# Patient Record
Sex: MALE | Race: ASIAN | NOT HISPANIC OR LATINO | URBAN - METROPOLITAN AREA
[De-identification: names, ages, dates, MRNs, and addresses within clinical notes are randomized per-mention and may not be internally consistent; named-entity substitution may affect disease eponyms.]

---

## 2021-02-15 ENCOUNTER — COMMUNICATION - HEALTHEAST (OUTPATIENT)
Dept: SCHEDULING | Facility: CLINIC | Age: 51
End: 2021-02-15

## 2021-06-16 PROBLEM — G45.9 TIA (TRANSIENT ISCHEMIC ATTACK): Status: ACTIVE | Noted: 2021-02-14

## 2023-10-29 ENCOUNTER — APPOINTMENT (OUTPATIENT)
Dept: MRI IMAGING | Facility: HOSPITAL | Age: 53
End: 2023-10-29
Attending: NURSE PRACTITIONER
Payer: COMMERCIAL

## 2023-10-29 ENCOUNTER — HOSPITAL ENCOUNTER (EMERGENCY)
Facility: HOSPITAL | Age: 53
Discharge: HOME | End: 2023-10-29
Attending: EMERGENCY MEDICINE | Admitting: EMERGENCY MEDICINE
Payer: COMMERCIAL

## 2023-10-29 ENCOUNTER — APPOINTMENT (OUTPATIENT)
Dept: RADIOLOGY | Facility: HOSPITAL | Age: 53
End: 2023-10-29
Attending: EMERGENCY MEDICINE
Payer: COMMERCIAL

## 2023-10-29 VITALS
DIASTOLIC BLOOD PRESSURE: 75 MMHG | SYSTOLIC BLOOD PRESSURE: 161 MMHG | TEMPERATURE: 97.2 F | HEART RATE: 64 BPM | WEIGHT: 146 LBS | RESPIRATION RATE: 20 BRPM | BODY MASS INDEX: 24.3 KG/M2 | OXYGEN SATURATION: 96 %

## 2023-10-29 DIAGNOSIS — R42 DIZZINESS: ICD-10-CM

## 2023-10-29 DIAGNOSIS — Z86.39 HISTORY OF DIABETES MELLITUS: ICD-10-CM

## 2023-10-29 DIAGNOSIS — R07.9 CHEST PAIN, UNSPECIFIED TYPE: ICD-10-CM

## 2023-10-29 DIAGNOSIS — I10 HYPERTENSION, UNSPECIFIED TYPE: ICD-10-CM

## 2023-10-29 DIAGNOSIS — H81.11 BENIGN PAROXYSMAL POSITIONAL VERTIGO OF RIGHT EAR: ICD-10-CM

## 2023-10-29 LAB
ANION GAP SERPL CALCULATED.3IONS-SCNC: 12 MMOL/L (ref 7–15)
BASOPHILS # BLD AUTO: 0 10E3/UL (ref 0–0.2)
BASOPHILS NFR BLD AUTO: 0 %
BUN SERPL-MCNC: 23.7 MG/DL (ref 6–20)
CALCIUM SERPL-MCNC: 9.4 MG/DL (ref 8.6–10)
CHLORIDE SERPL-SCNC: 101 MMOL/L (ref 98–107)
CREAT SERPL-MCNC: 1.31 MG/DL (ref 0.67–1.17)
DEPRECATED HCO3 PLAS-SCNC: 24 MMOL/L (ref 22–29)
EGFRCR SERPLBLD CKD-EPI 2021: 65 ML/MIN/1.73M2
EOSINOPHIL # BLD AUTO: 0.2 10E3/UL (ref 0–0.7)
EOSINOPHIL NFR BLD AUTO: 2 %
ERYTHROCYTE [DISTWIDTH] IN BLOOD BY AUTOMATED COUNT: 12.7 % (ref 10–15)
GLUCOSE SERPL-MCNC: 177 MG/DL (ref 70–99)
HCT VFR BLD AUTO: 42.6 % (ref 40–53)
HGB BLD-MCNC: 14.3 G/DL (ref 13.3–17.7)
IMM GRANULOCYTES # BLD: 0 10E3/UL
IMM GRANULOCYTES NFR BLD: 1 %
LYMPHOCYTES # BLD AUTO: 1.2 10E3/UL (ref 0.8–5.3)
LYMPHOCYTES NFR BLD AUTO: 20 %
MCH RBC QN AUTO: 28.3 PG (ref 26.5–33)
MCHC RBC AUTO-ENTMCNC: 33.6 G/DL (ref 31.5–36.5)
MCV RBC AUTO: 84 FL (ref 78–100)
MONOCYTES # BLD AUTO: 0.4 10E3/UL (ref 0–1.3)
MONOCYTES NFR BLD AUTO: 7 %
NEUTROPHILS # BLD AUTO: 4.3 10E3/UL (ref 1.6–8.3)
NEUTROPHILS NFR BLD AUTO: 70 %
NRBC # BLD AUTO: 0 10E3/UL
NRBC BLD AUTO-RTO: 0 /100
NT-PROBNP SERPL-MCNC: 162 PG/ML (ref 0–900)
PLATELET # BLD AUTO: 194 10E3/UL (ref 150–450)
POTASSIUM SERPL-SCNC: 4.5 MMOL/L (ref 3.4–5.3)
RBC # BLD AUTO: 5.06 10E6/UL (ref 4.4–5.9)
SODIUM SERPL-SCNC: 137 MMOL/L (ref 135–145)
TROPONIN T SERPL HS-MCNC: 21 NG/L
TROPONIN T SERPL HS-MCNC: 22 NG/L
WBC # BLD AUTO: 6.2 10E3/UL (ref 4–11)

## 2023-10-29 PROCEDURE — 99285 EMERGENCY DEPT VISIT HI MDM: CPT | Mod: 25

## 2023-10-29 PROCEDURE — 99207 PR NO CHARGE LOS: CPT | Performed by: NURSE PRACTITIONER

## 2023-10-29 PROCEDURE — 70553 MRI BRAIN STEM W/O & W/DYE: CPT

## 2023-10-29 PROCEDURE — 84484 ASSAY OF TROPONIN QUANT: CPT | Performed by: EMERGENCY MEDICINE

## 2023-10-29 PROCEDURE — 93005 ELECTROCARDIOGRAM TRACING: CPT | Performed by: STUDENT IN AN ORGANIZED HEALTH CARE EDUCATION/TRAINING PROGRAM

## 2023-10-29 PROCEDURE — 70544 MR ANGIOGRAPHY HEAD W/O DYE: CPT | Mod: XU

## 2023-10-29 PROCEDURE — 71046 X-RAY EXAM CHEST 2 VIEWS: CPT

## 2023-10-29 PROCEDURE — 85025 COMPLETE CBC W/AUTO DIFF WBC: CPT | Performed by: EMERGENCY MEDICINE

## 2023-10-29 PROCEDURE — 255N000002 HC RX 255 OP 636: Mod: JZ | Performed by: EMERGENCY MEDICINE

## 2023-10-29 PROCEDURE — 70549 MR ANGIOGRAPH NECK W/O&W/DYE: CPT

## 2023-10-29 PROCEDURE — 93005 ELECTROCARDIOGRAM TRACING: CPT | Performed by: EMERGENCY MEDICINE

## 2023-10-29 PROCEDURE — 36415 COLL VENOUS BLD VENIPUNCTURE: CPT | Performed by: EMERGENCY MEDICINE

## 2023-10-29 PROCEDURE — 83880 ASSAY OF NATRIURETIC PEPTIDE: CPT | Performed by: EMERGENCY MEDICINE

## 2023-10-29 PROCEDURE — A9585 GADOBUTROL INJECTION: HCPCS | Mod: JZ | Performed by: EMERGENCY MEDICINE

## 2023-10-29 PROCEDURE — 80048 BASIC METABOLIC PNL TOTAL CA: CPT | Performed by: EMERGENCY MEDICINE

## 2023-10-29 RX ORDER — GADOBUTROL 604.72 MG/ML
6 INJECTION INTRAVENOUS ONCE
Status: COMPLETED | OUTPATIENT
Start: 2023-10-29 | End: 2023-10-29

## 2023-10-29 RX ADMIN — GADOBUTROL 6 ML: 604.72 INJECTION INTRAVENOUS at 10:32

## 2023-10-29 ASSESSMENT — ENCOUNTER SYMPTOMS
VOMITING: 0
DIZZINESS: 1
ABDOMINAL PAIN: 0
DIARRHEA: 0
NAUSEA: 0
DIFFICULTY URINATING: 0
HEADACHES: 1

## 2023-10-29 ASSESSMENT — ACTIVITIES OF DAILY LIVING (ADL)
ADLS_ACUITY_SCORE: 35

## 2023-10-29 NOTE — ED PROVIDER NOTES
EMERGENCY DEPARTMENT ENCOUNTER      NAME: Pio Vides  AGE: 53 year old male  YOB: 1970  MRN: [id number]  EVALUATION DATE & TIME: No admission date for patient encounter.    PCP: No primary care provider on file.    ED PROVIDER: Indira Norris M.D.      CHIEF COMPLAINT     Chief Complaint   Patient presents with     Dizziness         FINAL IMPRESSION:     1. Dizziness    2. Benign paroxysmal positional vertigo of right ear    3. Hypertension, unspecified type    4. History of diabetes mellitus    5. Chest pain, unspecified type          MEDICAL DECISION MAKING:       Pertinent Labs & Imaging studies reviewed. (See chart for details)    53 year old male presents to the Emergency Department for evaluation of dizziness with turning the head.    History  Supplemental history from [facility]  External Record(s) review as documented below    Exam  Well-appearing cooperative and pleasant.  No focal neurological deficits.    Differential Diagnosis include but not limited to positional vertigo TIA CVA acute coronary syndrome sepsis hypertensive emergency among others.      Vital Signs: Hypertensive  EKG: Normal sinus rhythm with sinus arrhythmia normal anterior progression normal axis  Imaging: I independently interpreted cxr no acute.Formal read by radiologist.  Home meds: reviewed  ED meds: Patient took his own blood pressure medications.  Fluids: TKO  Labs:  K 4.5  Cr 1.31  Wbc 6.2  Hgb 14.3  platelets 194  Troponin 12 - 22    Clinical Impression and Decision Making  52 yo male history of DM HTN TIA  Here with vertigo after turning head to right  Short episode of chest discomfort  Not exertional no nausea  Sx resolved at arrival  No focal neuro deficit  D/w Stroke Team  MRI no acute  Already on plavix  EKG x 2 no acute  Troponin not increasing  Referral [facility]  Patient and wife in agreement  Discharged ambulatory in stable condition    In addition to the work out documented, I considered the  following work up Admission  Patient symptoms resolved  Already on plavix  MRI negative  Patient comfortable going home           Medical Decision Making    History:    Supplemental history from: Documented in chart, if applicable    External Record(s) reviewed: Documented in chart, if applicable.    Work Up:    Chart documentation includes differential considered and any EKGs or imaging independently interpreted by provider, where specified.    In additional to work up documented, I considered the following work up: Documented in chart, if applicable.    External consultation:    Discussion of management with another provider: Documented in chart, if applicable    Complicating factors:    Care impacted by chronic illness: Cerebrovascular Disease, Diabetes, Hyperlipidemia, and Hypertension    Care affected by social determinants of health: N/A    Disposition considerations: Discharged  Instructed to continue Plavix          Review of External Records  Hospital/Clinic: [facility] (telemedicine)  Date:9/18/23  Type II diabetes, takes insulin. Hypertension, hyperlipidemia.    External Consultation  Stroke Team      ED COURSE   8:41 AM I met with the patient, obtained history, performed an initial exam, and discussed options and plan for diagnostics and treatment here in the ED.    8:57 AM Wife at bedside. Reevaluated and updated.    9:10 AM Spoke with stroke team.    9:27 AM Reevaluated and updated.    12:02 PM Reevaluated and updated.    12:55 PM Reevaluated and updated on plan for discharge. Recommended to continue taking Plavix    1:21 PM. Updated all questions answered     At the conclusion of the encounter I discussed the results of all of the tests and the disposition. The questions were answered. The patient and wife acknowledged understanding and was agreeable with the care plan.         MEDICATIONS GIVEN IN THE EMERGENCY:     Medications   gadobutrol (GADAVIST)  injection 6 mL (6 mLs Intravenous $Given 10/29/23 1032)       NEW PRESCRIPTIONS STARTED AT TODAY'S ER VISIT     Discharge Medication List as of 10/29/2023  1:09 PM             =================================================================    HPI     Patient information was obtained from: patient     Use of : N/A        Pio Vides is a 53 year old male who presents by walking.    Patient reports he went to the bathroom at 6:30 AM and felt dizzy with when he laid down on his right side. He laid on his back and it went away. He got up to brush is teeth 10 minutes later and the dizziness returned. Patient went to bed at 11 PM last night. He also notes a right-sided headache. He has not taken his blood pressure meds today and usually takes them around 7 or 8 AM.    Patient denies any vision changes, abdominal pain, nausea, vomiting, diarrhea, urinary symptoms, smoking, or difficulty walking.    Chart Review: 2/14/23 ED visit at [facility] for a TIA.    REVIEW OF SYSTEMS   Review of Systems   Eyes:  Negative for visual disturbance.   Gastrointestinal:  Negative for abdominal pain, diarrhea, nausea and vomiting.   Genitourinary:  Negative for difficulty urinating.   Neurological:  Positive for dizziness and headaches (right side).   All other systems reviewed and are negative.       PAST MEDICAL HISTORY:   No past medical history on file.    PAST SURGICAL HISTORY:   No past surgical history on file.      CURRENT MEDICATIONS:   aspirin 81 MG EC tablet  atenoloL (TENORMIN) 50 MG tablet  canagliflozin (INVOKANA) 100 mg Tab  clopidogreL (PLAVIX) 75 mg tablet  hydrALAZINE (APRESOLINE) 25 MG tablet  insulin glargine (LANTUS SOLOSTAR PEN) 100 unit/mL (3 mL) pen  insulin lispro (HUMALOG KWIKPEN INSULIN SUBQ)  liraglutide (VICTOZA) 0.6 mg/0.1 mL (18 mg/3 mL) injection  losartan (COZAAR) 100 MG tablet  rosuvastatin (CRESTOR) 40 MG tablet         ALLERGIES:     Allergies   Allergen Reactions     Ace Inhibitors Cough      Amlodipine Unknown     Swelling of lower extremities     Irbesartan Rash       FAMILY HISTORY:   No family history on file.    SOCIAL HISTORY:     Social History     Socioeconomic History     Marital status:        VITALS:   BP (!) 161/75   Pulse 64   Temp 97.2  F (36.2  C) (Tympanic)   Resp 20   Wt 66.2 kg (146 lb)   SpO2 96%   BMI 24.30 kg/m      PHYSICAL EXAM     Physical Exam  Vitals and nursing note reviewed. Exam conducted with a chaperone present.   Constitutional:       General: He is not in acute distress.     Appearance: Normal appearance. He is obese. He is not ill-appearing, toxic-appearing or diaphoretic.   Neurological:      Mental Status: He is alert.         Physical Exam   Constitutional: Well-appearing.    Head: Atraumatic.     Nose: Nose normal.     Mouth/Throat: Oropharynx is clear and moist.     Eyes: EOM are normal. Pupils are equal, round, and reactive to light.     Ears: Bilateral pearly white tympanic membranes.    Neck: Normal range of motion. Neck supple.     Cardiovascular: Normal rate, regular rhythm and normal heart sounds.      Pulmonary/Chest: Normal effort  and breath sounds normal.     Abdominal: soft nontender.    Musculoskeletal: Normal range of motion.     Neurological: Moves upper and lower extremities equally.    Lymphatics: no edema    : NA    Skin: Skin is warm and dry. Surgical scar on left arm.    Psychiatric: Normal mood and affect. Behavior is normal.     [employer] Stroke Scale  Exam Interval: Baseline   Score    Level of consciousness: (0)   Alert, keenly responsive    LOC questions: (0)   Answers both questions correctly    LOC commands: (0)   Performs both tasks correctly    Best gaze: (0)   Normal    Visual: (0)   No visual loss    Facial palsy: (0)   Normal symmetrical movements    Motor arm (left): (0)   No drift    Motor arm (right): (0)   No drift    Motor leg (left): (0)   No drift    Motor leg (right): (0)   No drift    Limb  ataxia: (0)   Absent    Sensory: (0)   Normal- no sensory loss    Best language: (0)   Normal- no aphasia    Dysarthria: (0)   Normal    Extinction and inattention: (0)   No abnormality        Total Score:  0           LAB:     All pertinent labs reviewed and interpreted.  Labs Ordered and Resulted from Time of ED Arrival to Time of ED Departure   BASIC METABOLIC PANEL - Abnormal       Result Value    Sodium 137      Potassium 4.5      Chloride 101      Carbon Dioxide (CO2) 24      Anion Gap 12      Urea Nitrogen 23.7 (*)     Creatinine 1.31 (*)     GFR Estimate 65      Calcium 9.4      Glucose 177 (*)    TROPONIN T, HIGH SENSITIVITY - Normal    Troponin T, High Sensitivity 21     NT PROBNP INPATIENT - Normal    N terminal Pro BNP Inpatient 162     TROPONIN T, HIGH SENSITIVITY - Normal    Troponin T, High Sensitivity 22     CBC WITH PLATELETS AND DIFFERENTIAL    WBC Count 6.2      RBC Count 5.06      Hemoglobin 14.3      Hematocrit 42.6      MCV 84      MCH 28.3      MCHC 33.6      RDW 12.7      Platelet Count 194      % Neutrophils 70      % Lymphocytes 20      % Monocytes 7      % Eosinophils 2      % Basophils 0      % Immature Granulocytes 1      NRBCs per 100 WBC 0      Absolute Neutrophils 4.3      Absolute Lymphocytes 1.2      Absolute Monocytes 0.4      Absolute Eosinophils 0.2      Absolute Basophils 0.0      Absolute Immature Granulocytes 0.0      Absolute NRBCs 0.0          RADIOLOGY:     Reviewed all pertinent imaging. Please see official radiology report.  Chest XR,  PA & LAT   Final Result   IMPRESSION: Negative chest.      MRA Neck (Carotids) wo & w Contrast   Final Result   IMPRESSION:   HEAD MRI:    1.  No acute intracranial abnormality. No evidence of acute infarct, hemorrhage, or mass.      HEAD MRA:    1.  When compared to 02/14/2021, there is unchanged severe stenosis of the cavernous right ICA segment and the precavernous and cavernous left ICA segments.      2.  There is unchanged severe  stenosis of the M1 segment of the left MCA.      NECK MRA:   1.  Unchanged diminutive right vertebral artery and dominant left vertebral artery. Unchanged mild stenosis at the origin of the right vertebral artery.      MR Brain w/o & w Contrast   Final Result   IMPRESSION:   HEAD MRI:    1.  No acute intracranial abnormality. No evidence of acute infarct, hemorrhage, or mass.      HEAD MRA:    1.  When compared to 02/14/2021, there is unchanged severe stenosis of the cavernous right ICA segment and the precavernous and cavernous left ICA segments.      2.  There is unchanged severe stenosis of the M1 segment of the left MCA.      NECK MRA:   1.  Unchanged diminutive right vertebral artery and dominant left vertebral artery. Unchanged mild stenosis at the origin of the right vertebral artery.      MRA Brain ([place]) w/o Contrast   Final Result   IMPRESSION:   HEAD MRI:    1.  No acute intracranial abnormality. No evidence of acute infarct, hemorrhage, or mass.      HEAD MRA:    1.  When compared to 02/14/2021, there is unchanged severe stenosis of the cavernous right ICA segment and the precavernous and cavernous left ICA segments.      2.  There is unchanged severe stenosis of the M1 segment of the left MCA.      NECK MRA:   1.  Unchanged diminutive right vertebral artery and dominant left vertebral artery. Unchanged mild stenosis at the origin of the right vertebral artery.           EKG:     EKG #1  Sinus rhythm with sinus arrhythmia normal anterior progression normal axis    Time:150472    Ventricular rate 69 bmp  Axis Normal  AK interval 152 ms  QRS duration 80 ms  QT//422 ms    Compared to previous EKG on 2/14/2021 no significant changes.    I have independently reviewed and interpreted the EKG(s) documented above.    EKG #2  Sinus bradycardia with sinus arrhythmia no anterior progression normal axis.    Time:897538    Ventricular rate 58 bmp  Axis normal  AK interval 148 ms  QRS duration 82  ms  QT//406 ms    Compared to previous EKG on no change    PROCEDURES:     Procedures      I, Delphine Dai, am serving as a scribe to document services personally performed by Dr. Norris based on my observation and the provider's statements to me. I, Indira Norris MD attest that Delphine Dai is acting in a scribe capacity, has observed my performance of the services and has documented them in accordance with my direction.    Indira Norris M.D.  Emergency Medicine  [facility] EMERGENCY DEPARTMENT  [address] [place] [id number] [phone]  Dept: [phone]       Indira Norris MD  10/29/23 8863

## 2023-10-29 NOTE — PROGRESS NOTES
Interval Vascular Neurology Note    Symptoms reportedly improved with blood pressure management. MRI/MRA looks stable; no acute findings. He should continue plavix and rosuvastatin, check his BP twice daily and keep a log, and follow up with his PCP in 1-2 weeks.     Nela Alicia, NP

## 2023-10-29 NOTE — ED TRIAGE NOTES
Patient arrives by private car (patient drove self) for evaluation of intermittent dizziness since 0630 this am.  Patient reports that he has had mild intermittent dizziness x 10 years, came in this am due to dizziness seemed more intense.  Patient has not taken his BP medication this am.

## 2023-10-29 NOTE — ED NOTES
Patient took his home medications orally without difficulty prior to RN being able to complete dysphagia screening. Patient had no difficulties with swallowing. Home meds included the followinmg hydralazine  25mg atenolol  100mg losaartan  10mg jardiance  40mg rosuvastatin  75mg clopidogrel  Vitamin D3, unknown dose

## 2023-10-29 NOTE — DISCHARGE INSTRUCTIONS
Read and follow the discharge instructions.    Your MRI did not show any new strokes.    Continue taking your Plavix.    Your blood pressure was high make sure you take your blood pressure medications.    I am doing a referral to the cardiologist for the episode of chest discomfort.    Do call your primary care doctor tomorrow to let them know you were in the emergency department.    Return or call 911 for return of symptoms or any other concerns.   never

## 2023-10-29 NOTE — CONSULTS
"[facility]    Stroke Telephone Note    I was called by Indira Norris on 10/29/23 regarding patient Pio Vides. The patient is a 53 year old male with PMHx significant for CKD, HTN, glaucoma, HLD, migraine, hyperthyroid, DM2, TIA, known left M1 stenosis. He has mild intermittent dizziness at baseline for the last 10 or so years. He was at his baseline last night around 2300 and woke up with morning around 0600 with acutely worse dizziness. Presenting BP is 230/107.     BP (!) 230/107   Pulse 68   Temp 97.2  F (36.2  C) (Tympanic)   Resp 18   Wt 66.2 kg (146 lb)   SpO2 99%   BMI 24.30 kg/m       Imaging Findings  MRI/MRA pending     Impression  Acute dizziness: stroke/TIA vs hypertensive crisis     Recommendations  - MRI brain w/ and w/out contrast and MRA brain and neck (ordered)  - if MRI shows acute stroke, admit for work up and place IP gen neuro consult  - continue PTA antiplatelet and statin therapy   - SBP < 220 for now; if no stroke on MRI, SBP goal < 180   - given hx of TIA and known atherosclerotic disease, tight risk factor control  - A1c goal < 7  - LDL goal 40-70   - BP goal 130/80    Case discussed with vascular neurology attending Dr. Scott Saunders.     My recommendations are based on the information provided over the phone by Pio Vides's in-person providers. They are not intended to replace the clinical judgment of his in-person providers. I was not requested to personally see or examine the patient at this time.    Nela Alicia NP  Vascular Neurology    To page me or covering stroke neurology team member, click here: [facility]COM  Choose \"On Call\" tab at top, then select \"NEUROLOGY/ALL SITES\" from middle drop-down box, press Enter, then look for \"stroke\" or \"telestroke\" for your site.   "

## 2023-11-02 LAB
ATRIAL RATE - MUSE: 58 BPM
ATRIAL RATE - MUSE: 69 BPM
DIASTOLIC BLOOD PRESSURE - MUSE: 77 MMHG
DIASTOLIC BLOOD PRESSURE - MUSE: NORMAL MMHG
INTERPRETATION ECG - MUSE: NORMAL
INTERPRETATION ECG - MUSE: NORMAL
P AXIS - MUSE: 57 DEGREES
P AXIS - MUSE: 58 DEGREES
PR INTERVAL - MUSE: 148 MS
PR INTERVAL - MUSE: 152 MS
QRS DURATION - MUSE: 80 MS
QRS DURATION - MUSE: 82 MS
QT - MUSE: 394 MS
QT - MUSE: 414 MS
QTC - MUSE: 406 MS
QTC - MUSE: 422 MS
R AXIS - MUSE: 69 DEGREES
R AXIS - MUSE: 70 DEGREES
SYSTOLIC BLOOD PRESSURE - MUSE: 163 MMHG
SYSTOLIC BLOOD PRESSURE - MUSE: NORMAL MMHG
T AXIS - MUSE: 50 DEGREES
T AXIS - MUSE: 75 DEGREES
VENTRICULAR RATE- MUSE: 58 BPM
VENTRICULAR RATE- MUSE: 69 BPM